# Patient Record
(demographics unavailable — no encounter records)

---

## 2025-05-06 NOTE — PLAN
[FreeTextEntry1] : mammogram yearly Dexa 2026. Colonoscopy and Cologuard refused.  Will discuss with PCP STI testing and bloodwork refused.

## 2025-05-06 NOTE — PHYSICAL EXAM
[Chaperoned Physical Exam] : A chaperone was present in the examining room during all aspects of the physical examination. [MA] : MA [FreeTextEntry2] : Ame [Appropriately responsive] : appropriately responsive [Alert] : alert [No Acute Distress] : no acute distress [No Lymphadenopathy] : no lymphadenopathy [Soft] : soft [Non-tender] : non-tender [Non-distended] : non-distended [No HSM] : No HSM [No Lesions] : no lesions [No Mass] : no mass [Oriented x3] : oriented x3 [Examination Of The Breasts] : a normal appearance [No Masses] : no breast masses were palpable [Labia Majora] : normal [Labia Minora] : normal [Normal] : normal [Uterine Adnexae] : normal [Declined] : Patient declined rectal exam

## 2025-05-06 NOTE — HISTORY OF PRESENT ILLNESS
[FreeTextEntry1] : Check up.  Feels well. Desires refill alprazolam, uses approx 1 per month for anxiety. No pelvic pain or bleeding.